# Patient Record
Sex: FEMALE | Race: BLACK OR AFRICAN AMERICAN | Employment: UNEMPLOYED | URBAN - METROPOLITAN AREA
[De-identification: names, ages, dates, MRNs, and addresses within clinical notes are randomized per-mention and may not be internally consistent; named-entity substitution may affect disease eponyms.]

---

## 2019-12-18 ENCOUNTER — HOSPITAL ENCOUNTER (EMERGENCY)
Facility: CLINIC | Age: 39
Discharge: HOME | End: 2019-12-18
Attending: EMERGENCY MEDICINE | Admitting: EMERGENCY MEDICINE
Payer: COMMERCIAL

## 2019-12-18 VITALS
SYSTOLIC BLOOD PRESSURE: 128 MMHG | DIASTOLIC BLOOD PRESSURE: 72 MMHG | WEIGHT: 148 LBS | RESPIRATION RATE: 16 BRPM | OXYGEN SATURATION: 100 % | TEMPERATURE: 97.3 F | HEART RATE: 68 BPM | BODY MASS INDEX: 24.63 KG/M2

## 2019-12-18 DIAGNOSIS — F14.20 COCAINE DEPENDENCE WITHOUT COMPLICATION (H): ICD-10-CM

## 2019-12-18 PROCEDURE — 99283 EMERGENCY DEPT VISIT LOW MDM: CPT | Mod: Z6 | Performed by: EMERGENCY MEDICINE

## 2019-12-18 PROCEDURE — 99283 EMERGENCY DEPT VISIT LOW MDM: CPT | Performed by: EMERGENCY MEDICINE

## 2019-12-18 ASSESSMENT — ENCOUNTER SYMPTOMS: PSYCHIATRIC NEGATIVE: 1

## 2019-12-18 NOTE — ED AVS SNAPSHOT
[facility], [place], Emergency Department  [address]  [place] [address]  Phone:  [phone]  Fax:  [phone]                                    Jolie Keith   MRN: [id number]    Department:  [facility], Emergency Department   Date of Visit:  12/18/2019           After Visit Summary Signature Page    I have received my discharge instructions, and my questions have been answered. I have discussed any challenges I see with this plan with the nurse or doctor.    ..........................................................................................................................................  Patient/Patient Representative Signature      ..........................................................................................................................................  Patient Representative Print Name and Relationship to Patient    ..................................................               ................................................  Date                                   Time    ..........................................................................................................................................  Reviewed by Signature/Title    ...................................................              ..............................................  Date                                               Time          22EPIC Rev 08/18

## 2019-12-19 NOTE — ED PROVIDER NOTES
History     Chief Complaint   Patient presents with     Addiction Problem     pt here from treatment [facility] she was hoping to check into but they felt she needed detox prior to admiting her     HPI  Jolie Keith is a 39 year old female who says she was sent here by [facility] for detox prior to starting treatment there. She says she has been using about 2-3 bags of crack cocaine over the past 1.5 years.  She says she has about 3-4 drinks on the weekends but doesn't drink during the week.  She never has withdrawal symptoms from alcohol.  She says that she had morphine in her tox screen today.  She says she doesn't use opiates but there may have been morphine cut into the crack she used. She denies si/hi.  She denies acute mental health issues.     I have reviewed the Medications, Allergies, Past Medical and Surgical History, and Social History in the Epic system.    Review of Systems   Psychiatric/Behavioral: Negative.    All other systems reviewed and are negative.      Physical Exam   BP: 134/74  Pulse: 68  Temp: 97.5  F (36.4  C)  Resp: 14  Weight: 67.1 kg (148 lb)  SpO2: 98 %      Physical Exam  Vitals signs and nursing note reviewed.   Constitutional:       Appearance: Normal appearance.   HENT:      Head: Normocephalic and atraumatic.      Right Ear: External ear normal.      Left Ear: External ear normal.      Nose: Nose normal.      Mouth/Throat:      Mouth: Mucous membranes are moist.   Eyes:      General: No scleral icterus.     Extraocular Movements: Extraocular movements intact.   Neck:      Musculoskeletal: Normal range of motion and neck supple.   Cardiovascular:      Rate and Rhythm: Normal rate.      Heart sounds: Normal heart sounds.   Pulmonary:      Effort: Pulmonary effort is normal.   Musculoskeletal: Normal range of motion.   Skin:     General: Skin is warm and dry.   Neurological:      General: No focal deficit present.      Mental Status: She is alert and oriented to person, place, and time.    Psychiatric:         Mood and Affect: Mood normal.         Behavior: Behavior normal.         Thought Content: Thought content normal.         Judgment: Judgment normal.         ED Course        Procedures           Labs Ordered and Resulted from Time of ED Arrival Up to the Time of Departure from the ED - No data to display         Assessments & Plan (with Medical Decision Making)   The patient was sent here from [facility] stating she needed to go to detox because she had opiates in her urine today.  She says she only uses crack.  She says it's possible that there was opiates in the crack but she doesn't abuse opiates.  She drinks a few drinks on the weekends but not during the week. She says she never has issue with alcohol and alcohol withdrawal. She does not abuse benzos.  Based on this information she does not need detox here.  I also called [facility] and they also stated she was sent her because of the positive opiate in the urine and not because of any further opiate use concerns.  She was discharge to f/u with [facility] tomorrow.  I let them know she would be contacting them tomorrow. No si/hi.  No acute mental health concerns.     I have reviewed the nursing notes.    I have reviewed the findings, diagnosis, plan and need for follow up with the patient.    New Prescriptions    No medications on file       Final diagnoses:   Cocaine dependence without complication (H)       12/18/2019   [facility], [place], EMERGENCY DEPARTMENT     Jolie Osorio MD  12/18/19 [id number]

## 2019-12-19 NOTE — DISCHARGE INSTRUCTIONS
Based upon our evaluation today, the patient does not need detox.  We do not detox for crack cocaine.  The patient denies routine opiate abuse.     Follow up with [facility] tomorrow to begin treatment there.